# Patient Record
Sex: FEMALE | Race: WHITE
[De-identification: names, ages, dates, MRNs, and addresses within clinical notes are randomized per-mention and may not be internally consistent; named-entity substitution may affect disease eponyms.]

---

## 2018-02-21 ENCOUNTER — HOSPITAL ENCOUNTER (EMERGENCY)
Dept: HOSPITAL 46 - ED | Age: 33
Discharge: HOME | End: 2018-02-21
Payer: COMMERCIAL

## 2018-02-21 VITALS — WEIGHT: 110.01 LBS | HEIGHT: 60 IN | BODY MASS INDEX: 21.6 KG/M2

## 2018-02-21 DIAGNOSIS — S44.92XA: Primary | ICD-10-CM

## 2018-02-21 DIAGNOSIS — Z79.899: ICD-10-CM

## 2018-02-21 DIAGNOSIS — Z88.5: ICD-10-CM

## 2018-02-21 DIAGNOSIS — G40.909: ICD-10-CM
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
## 2018-02-21 DIAGNOSIS — F17.210: ICD-10-CM

## 2018-02-21 DIAGNOSIS — M62.838: ICD-10-CM

## 2018-02-21 NOTE — EKG
[facility]
                                    [address]
                                  [place]  [address]
_________________________________________________________________________________________
                                                                 Signed   
 
 
Normal sinus rhythm with sinus arrhythmia
Normal ECG
When compared with ECG of 09-NOV-2016 09:31,
No significant change was found
Confirmed by MADISON ESPINOZA MD ([id number]) on 2/21/2018 10:42:19 PM
 
 
 
 
 
 
 
 
 
 
 
 
 
 
 
 
 
 
 
 
 
 
 
 
 
 
 
 
 
 
 
 
 
 
 
 
 
 
 
 
    Electronically Signed By: MADISON ESPINOZA MD  02/21/18 [id number]
_________________________________________________________________________________________
PATIENT NAME:     PAULETTE HOLLAND                
MEDICAL RECORD #: [id number]                     Electrocardiogram             
          ACCT #: [id number]  
DATE OF BIRTH:   [birth year]                                       
PHYSICIAN:   MADISON ESPINOZA MD           REPORT #: [id number]
REPORT IS CONFIDENTIAL AND NOT TO BE RELEASED WITHOUT AUTHORIZATION